# Patient Record
Sex: MALE | Race: WHITE | Employment: FULL TIME | URBAN - METROPOLITAN AREA
[De-identification: names, ages, dates, MRNs, and addresses within clinical notes are randomized per-mention and may not be internally consistent; named-entity substitution may affect disease eponyms.]

---

## 2019-05-10 ENCOUNTER — APPOINTMENT (OUTPATIENT)
Dept: GENERAL RADIOLOGY | Facility: CLINIC | Age: 64
End: 2019-05-10
Attending: NURSE PRACTITIONER
Payer: OTHER MISCELLANEOUS

## 2019-05-10 ENCOUNTER — HOSPITAL ENCOUNTER (EMERGENCY)
Facility: CLINIC | Age: 64
Discharge: HOME | End: 2019-05-10
Attending: NURSE PRACTITIONER | Admitting: NURSE PRACTITIONER
Payer: OTHER MISCELLANEOUS

## 2019-05-10 VITALS
HEIGHT: 69 IN | SYSTOLIC BLOOD PRESSURE: 128 MMHG | BODY MASS INDEX: 28.14 KG/M2 | RESPIRATION RATE: 18 BRPM | TEMPERATURE: 97.8 F | DIASTOLIC BLOOD PRESSURE: 87 MMHG | WEIGHT: 190 LBS | OXYGEN SATURATION: 97 %

## 2019-05-10 DIAGNOSIS — S61.412A LACERATION OF LEFT HAND WITHOUT FOREIGN BODY, INITIAL ENCOUNTER: ICD-10-CM

## 2019-05-10 PROCEDURE — 73130 X-RAY EXAM OF HAND: CPT | Mod: LT

## 2019-05-10 PROCEDURE — 99282 EMERGENCY DEPT VISIT SF MDM: CPT | Mod: Z6 | Performed by: NURSE PRACTITIONER

## 2019-05-10 PROCEDURE — 12001 RPR S/N/AX/GEN/TRNK 2.5CM/<: CPT | Mod: LT | Performed by: NURSE PRACTITIONER

## 2019-05-10 PROCEDURE — 99283 EMERGENCY DEPT VISIT LOW MDM: CPT | Performed by: NURSE PRACTITIONER

## 2019-05-10 ASSESSMENT — ENCOUNTER SYMPTOMS
SORE THROAT: 0
DYSURIA: 0
FEVER: 0
COUGH: 0
WOUND: 1
EYE PAIN: 0
NUMBNESS: 0
CONFUSION: 0
SPEECH DIFFICULTY: 0
NAUSEA: 0
DIARRHEA: 0
VOMITING: 0
WEAKNESS: 0
ABDOMINAL PAIN: 0
CHILLS: 0
SINUS PAIN: 0
DIFFICULTY URINATING: 0
WHEEZING: 0
DIAPHORESIS: 0
CONSTIPATION: 0
SHORTNESS OF BREATH: 0

## 2019-05-10 ASSESSMENT — MIFFLIN-ST. JEOR: SCORE: 1637.21

## 2019-05-10 NOTE — ED PROVIDER NOTES
History     Chief Complaint   Patient presents with     Laceration     laceeration to palm of hand at work on block of steel.  unknown UTD on tetanus status     HPI  Salvador Ward is a 64 year old male who works as a  with steel punch presses and presents to the emergency department with a laceration to the palm of his left hand as a result of a steel punch hitting the palm of his left hand.  Patient reports mild discomfort and rates it a 3 out of 10.  Patient denies loss of sensation or motion.  Patient reports he initially felt a little lightheaded and clammy after the incident and this resolved within 15 to 20 minutes.  Patient reports feeling fine presently.  Patient's last tetanus was 10/22/2012.      Allergies:  No Known Allergies    Problem List:    There are no active problems to display for this patient.       Past Medical History:    No past medical history on file.    Past Surgical History:    No past surgical history on file.    Family History:    No family history on file.    Social History:  Marital Status:    Social History     Tobacco Use     Smoking status: Not on file   Substance Use Topics     Alcohol use: Not on file     Drug use: Not on file        Medications:      No current outpatient medications on file.      Review of Systems   Constitutional: Negative for chills, diaphoresis and fever.   HENT: Negative for ear pain, sinus pain and sore throat.    Eyes: Negative for pain and visual disturbance.   Respiratory: Negative for cough, shortness of breath and wheezing.    Cardiovascular: Negative for chest pain.   Gastrointestinal: Negative for abdominal pain, constipation, diarrhea, nausea and vomiting.   Genitourinary: Negative for difficulty urinating and dysuria.   Skin: Positive for wound (palm of left hand). Negative for rash.   Neurological: Negative for speech difficulty, weakness and numbness.   Psychiatric/Behavioral: Negative for confusion and self-injury.  "  All other systems reviewed and are negative.      Physical Exam   BP: 128/87  Heart Rate: 60  Temp: 97.8  F (36.6  C)  Resp: 18  Height: 175.3 cm (5' 9\")  Weight: 86.2 kg (190 lb)  SpO2: 97 %      Physical Exam   Constitutional: He is oriented to person, place, and time. He appears well-developed and well-nourished. He is cooperative. No distress.   HENT:   Head: Normocephalic and atraumatic.   Right Ear: Hearing and external ear normal.   Left Ear: Hearing and external ear normal.   Nose: Nose normal.   Mouth/Throat: Oropharynx is clear and moist.   Eyes: Conjunctivae are normal. Right eye exhibits no discharge. Left eye exhibits no discharge. No scleral icterus.   Neck: Normal range of motion. Neck supple.   Cardiovascular: Normal rate, regular rhythm, normal heart sounds and intact distal pulses. Exam reveals no gallop and no friction rub.   No murmur heard.  Pulmonary/Chest: Effort normal and breath sounds normal. No stridor. No respiratory distress. He has no wheezes. He has no rales.   Abdominal: There is no tenderness.   Musculoskeletal: He exhibits no edema or tenderness.        Left hand: He exhibits laceration (2 cm semi-circular laceration - simple without obvious tendon, ligamentous, or bony damage of middle of palm of left hand) and swelling (mild). He exhibits normal range of motion, no tenderness, no bony tenderness, normal two-point discrimination, normal capillary refill and no deformity. Normal sensation noted.   Lymphadenopathy:     He has no cervical adenopathy.   Neurological: He is alert and oriented to person, place, and time.   Skin: Skin is warm. Capillary refill takes less than 2 seconds. No rash noted. He is not diaphoretic.   Psychiatric: He has a normal mood and affect.   Nursing note and vitals reviewed.      ED Course        Procedures  [facility] Procedure Note          Laceration Repair:      Laceration repair  Performed by: Melnida Magallanes  Authorized by: Melinda VELASCO" "Magallanes  Consent: Verbal consent obtained.  Risks and benefits: risks, benefits and alternatives were discussed  Patient understanding: patient states understanding of the procedure being performed  Site marked: the operative site was identified  Time out: Immediately prior to procedure a \"time out\" was called to verify the correct patient, procedure, equipment, support staff and site/side marked as required.    Preparation: Patient was prepped and draped in usual sterile fashion.  Irrigation solution: saline    Body area: palm of left hand  Laceration length: 2cm  Contamination: The wound is not contaminated.  Foreign bodies:none  Tendon involvement:none  Anesthesia: Localinfiltration  Local anesthetic: Bupivacaine 0.25% withoutepinephrine and without buffer.  Anesthetic total: 2.5ml    Debridement: yes with saline and hibiclens  Skin closure:A total of 4  3-0 Ethylon  Technique: interrupted  Approximation: close  Approximation difficulty: simple      Patient tolerance: Patient tolerated the procedure well with no immediate complications.    Results for orders placed or performed during the hospital encounter of 05/10/19 (from the past 24 hour(s))   XR Hand Left G/E 3 Views    Narrative    LEFT HAND THREE OR MORE VIEWS  5/10/2019 11:18 AM     HISTORY: Hit with steel press punch.    COMPARISON: None.      Impression    IMPRESSION: No bony or soft tissue abnormality.       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   Exam as noted above and no obvious tendon, ligament, bony involvement.  Laceration repaired without complication 4 sutures placed.  X-ray obtained to rule out bony involvement due to nature of injury and was " negative for fracture.  No indication to place patient on antibiotics.  Did discuss risk factors and encourage watchful management and wound care.  Encourage follow-up in 8 days for suture removal.  Workability form filled out for employer.  Patient discharged in stable condition.     I have reviewed the nursing notes.    I have reviewed the findings, diagnosis, plan and need for follow up with the patient.       Medication List      There are no discharge medications for this visit.         Final diagnoses:   Laceration of left hand without foreign body, initial encounter - 4 sutures placed       5/10/2019   [facility] EMERGENCY DEPARTMENT     Melinda Magallanes, JAMAAL CNP  05/10/19 [id number]

## 2019-05-10 NOTE — DISCHARGE INSTRUCTIONS
Clean with soap and water once or twice daily and cover with bacitracin and bandage until healed. Be seen if signs of infection--pain, redness, swelling.   Return in 8 days for suture removal.

## 2019-05-10 NOTE — ED AVS SNAPSHOT
[facility] Emergency Department  [address] [address]  Phone:  [phone]  Fax:  [phone]                                    Salvador Ward   MRN: [id number]    Department:  [facility] Emergency Department   Date of Visit:  5/10/2019           After Visit Summary Signature Page    I have received my discharge instructions, and my questions have been answered. I have discussed any challenges I see with this plan with the nurse or doctor.    ..........................................................................................................................................  Patient/Patient Representative Signature      ..........................................................................................................................................  Patient Representative Print Name and Relationship to Patient    ..................................................               ................................................  Date                                   Time    ..........................................................................................................................................  Reviewed by Signature/Title    ...................................................              ..............................................  Date                                               Time          22EPIC Rev 08/18

## 2019-05-17 ENCOUNTER — ALLIED HEALTH/NURSE VISIT (OUTPATIENT)
Dept: FAMILY MEDICINE | Facility: CLINIC | Age: 64
End: 2019-05-17
Payer: OTHER MISCELLANEOUS

## 2019-05-17 DIAGNOSIS — Z48.02 VISIT FOR SUTURE REMOVAL: Primary | ICD-10-CM

## 2019-05-17 PROCEDURE — 99207 ZZC NO CHARGE NURSE ONLY: CPT

## 2019-05-17 NOTE — PROGRESS NOTES
"Salvador Ward presents to the clinic today for removal of sutures.  The patient has had the sutures in place for 7 days.  There has been no history of infection or drainage. Per 05/10/19 ED Notes: 4 sutures placed. follow-up in 8 days for suture removal. Per Up-To-Date: \"Trunk and upper extremities - Seven days\". Huddled with oscar Abdul to remove if area is approximated.     4 sutures are seen located on the left palm of hand.  The wound is healing well with no signs of infection.  Tetanus status is up to date.   All sutures were easily removed today.  Routine wound care discussed.  The patient will follow up as needed.     KLEVER BlackburnN, RN      "